# Patient Record
Sex: MALE | Race: WHITE | NOT HISPANIC OR LATINO | URBAN - METROPOLITAN AREA
[De-identification: names, ages, dates, MRNs, and addresses within clinical notes are randomized per-mention and may not be internally consistent; named-entity substitution may affect disease eponyms.]

---

## 2018-11-20 ENCOUNTER — NEW PATIENT (OUTPATIENT)
Dept: URBAN - METROPOLITAN AREA CLINIC 30 | Facility: CLINIC | Age: 83
End: 2018-11-20
Payer: MEDICARE

## 2018-11-20 DIAGNOSIS — H02.831 DERMATOCHALASIS OF RIGHT UPPER LID: ICD-10-CM

## 2018-11-20 DIAGNOSIS — H25.13 AGE-RELATED NUCLEAR CATARACT, BILATERAL: Primary | ICD-10-CM

## 2018-11-20 PROCEDURE — 99204 OFFICE O/P NEW MOD 45 MIN: CPT | Performed by: OPTOMETRIST

## 2018-11-20 PROCEDURE — 92134 CPTRZ OPH DX IMG PST SGM RTA: CPT | Performed by: OPTOMETRIST

## 2018-11-20 ASSESSMENT — VISUAL ACUITY
OS: 20/40
OD: 20/40

## 2018-11-20 ASSESSMENT — KERATOMETRY
OS: 45.04
OD: 45.43

## 2018-11-20 ASSESSMENT — INTRAOCULAR PRESSURE
OS: 16
OD: 18

## 2019-01-08 ENCOUNTER — Encounter (OUTPATIENT)
Dept: URBAN - METROPOLITAN AREA CLINIC 30 | Facility: CLINIC | Age: 84
End: 2019-01-08
Payer: MEDICARE

## 2019-01-08 DIAGNOSIS — H25.11 AGE-RELATED NUCLEAR CATARACT, RIGHT EYE: Primary | ICD-10-CM

## 2019-01-08 DIAGNOSIS — Z01.818 ENCOUNTER FOR OTHER PREPROCEDURAL EXAMINATION: Primary | ICD-10-CM

## 2019-01-08 PROCEDURE — 99213 OFFICE O/P EST LOW 20 MIN: CPT | Performed by: PHYSICIAN ASSISTANT

## 2019-01-09 ENCOUNTER — FOLLOW UP ESTABLISHED (OUTPATIENT)
Dept: URBAN - METROPOLITAN AREA CLINIC 24 | Facility: CLINIC | Age: 84
End: 2019-01-09
Payer: MEDICARE

## 2019-01-09 DIAGNOSIS — H02.834 DERMATOCHALASIS OF LEFT UPPER EYELID: ICD-10-CM

## 2019-01-09 PROCEDURE — 92083 EXTENDED VISUAL FIELD XM: CPT | Performed by: OPHTHALMOLOGY

## 2019-01-09 PROCEDURE — 92014 COMPRE OPH EXAM EST PT 1/>: CPT | Performed by: OPHTHALMOLOGY

## 2019-01-09 PROCEDURE — 92004 COMPRE OPH EXAM NEW PT 1/>: CPT | Performed by: OPHTHALMOLOGY

## 2019-01-09 PROCEDURE — 92020 GONIOSCOPY: CPT | Performed by: OPHTHALMOLOGY

## 2019-01-09 ASSESSMENT — INTRAOCULAR PRESSURE
OS: 20
OD: 20

## 2019-01-16 ENCOUNTER — SURGERY (OUTPATIENT)
Dept: URBAN - METROPOLITAN AREA SURGERY 12 | Facility: SURGERY | Age: 84
End: 2019-01-16
Payer: MEDICARE

## 2019-01-16 PROCEDURE — 66984 XCAPSL CTRC RMVL W/O ECP: CPT | Performed by: OPHTHALMOLOGY

## 2019-01-17 ENCOUNTER — POST OP (OUTPATIENT)
Dept: URBAN - METROPOLITAN AREA CLINIC 30 | Facility: CLINIC | Age: 84
End: 2019-01-17

## 2019-01-17 PROCEDURE — 99024 POSTOP FOLLOW-UP VISIT: CPT | Performed by: OPTOMETRIST

## 2019-01-17 ASSESSMENT — INTRAOCULAR PRESSURE: OD: 20

## 2019-01-23 ENCOUNTER — POST OP (OUTPATIENT)
Dept: URBAN - METROPOLITAN AREA CLINIC 30 | Facility: CLINIC | Age: 84
End: 2019-01-23
Payer: MEDICARE

## 2019-01-23 PROCEDURE — 99024 POSTOP FOLLOW-UP VISIT: CPT | Performed by: OPTOMETRIST

## 2019-01-23 PROCEDURE — 67820 REVISE EYELASHES: CPT | Performed by: OPTOMETRIST

## 2019-01-23 ASSESSMENT — INTRAOCULAR PRESSURE
OS: 15
OD: 10

## 2019-01-23 ASSESSMENT — VISUAL ACUITY
OD: 20/30
OS: 20/40

## 2019-01-30 ENCOUNTER — SURGERY (OUTPATIENT)
Dept: URBAN - METROPOLITAN AREA SURGERY 12 | Facility: SURGERY | Age: 84
End: 2019-01-30
Payer: MEDICARE

## 2019-01-30 PROCEDURE — 66984 XCAPSL CTRC RMVL W/O ECP: CPT | Performed by: OPHTHALMOLOGY

## 2019-01-31 ENCOUNTER — POST OP (OUTPATIENT)
Dept: URBAN - METROPOLITAN AREA CLINIC 30 | Facility: CLINIC | Age: 84
End: 2019-01-31

## 2019-01-31 PROCEDURE — 99024 POSTOP FOLLOW-UP VISIT: CPT | Performed by: OPTOMETRIST

## 2019-01-31 ASSESSMENT — INTRAOCULAR PRESSURE: OS: 9

## 2019-02-27 ENCOUNTER — POST OP (OUTPATIENT)
Dept: URBAN - METROPOLITAN AREA CLINIC 30 | Facility: CLINIC | Age: 84
End: 2019-02-27

## 2019-02-27 DIAGNOSIS — Z96.1 PRESENCE OF INTRAOCULAR LENS: Primary | ICD-10-CM

## 2019-02-27 PROCEDURE — 92015 DETERMINE REFRACTIVE STATE: CPT | Performed by: OPTOMETRIST

## 2019-02-27 PROCEDURE — 99024 POSTOP FOLLOW-UP VISIT: CPT | Performed by: OPTOMETRIST

## 2019-02-27 ASSESSMENT — VISUAL ACUITY
OS: 20/25
OD: 20/25

## 2019-02-27 ASSESSMENT — INTRAOCULAR PRESSURE
OS: 11
OD: 14

## 2019-03-06 ENCOUNTER — FOLLOW UP ESTABLISHED (OUTPATIENT)
Dept: URBAN - METROPOLITAN AREA CLINIC 30 | Facility: CLINIC | Age: 84
End: 2019-03-06
Payer: MEDICARE

## 2019-03-06 PROCEDURE — 92014 COMPRE OPH EXAM EST PT 1/>: CPT | Performed by: OPTOMETRIST

## 2019-03-06 PROCEDURE — 92134 CPTRZ OPH DX IMG PST SGM RTA: CPT | Performed by: OPTOMETRIST

## 2019-03-06 RX ORDER — CARBOXYMETHYLCELLULOSE SODIUM, GLYCERIN 5; 9 MG/ML; MG/ML
SOLUTION/ DROPS OPHTHALMIC
Qty: 0 | Refills: 0 | Status: INACTIVE
Start: 2019-03-06 | End: 2019-03-27

## 2019-03-06 RX ORDER — PREDNISOLONE ACETATE 10 MG/ML
1 % SUSPENSION/ DROPS OPHTHALMIC
Qty: 5 | Refills: 1 | Status: INACTIVE
Start: 2019-03-06 | End: 2019-05-28

## 2019-03-06 ASSESSMENT — VISUAL ACUITY
OS: 20/40
OD: 20/25

## 2019-03-06 ASSESSMENT — INTRAOCULAR PRESSURE
OS: 9
OD: 13

## 2019-03-27 ENCOUNTER — FOLLOW UP ESTABLISHED (OUTPATIENT)
Dept: URBAN - METROPOLITAN AREA CLINIC 30 | Facility: CLINIC | Age: 84
End: 2019-03-27
Payer: MEDICARE

## 2019-03-27 PROCEDURE — 99213 OFFICE O/P EST LOW 20 MIN: CPT | Performed by: OPTOMETRIST

## 2019-03-27 RX ORDER — BRIMONIDINE TARTRATE, TIMOLOL MALEATE 2; 5 MG/ML; MG/ML
SOLUTION/ DROPS OPHTHALMIC
Qty: 0 | Refills: 0 | Status: INACTIVE
Start: 2019-03-27 | End: 2019-04-08

## 2019-03-27 ASSESSMENT — INTRAOCULAR PRESSURE
OS: 10
OD: 12

## 2019-04-29 ENCOUNTER — RX CHECK (OUTPATIENT)
Dept: URBAN - METROPOLITAN AREA CLINIC 30 | Facility: CLINIC | Age: 84
End: 2019-04-29
Payer: MEDICARE

## 2019-04-29 DIAGNOSIS — H20.012 PRIMARY IRIDOCYCLITIS, LEFT EYE: ICD-10-CM

## 2019-04-29 DIAGNOSIS — H04.123 TEAR FILM INSUFFICIENCY OF BILATERAL LACRIMAL GLANDS: Primary | ICD-10-CM

## 2019-04-29 PROCEDURE — 99024 POSTOP FOLLOW-UP VISIT: CPT | Performed by: OPTOMETRIST

## 2019-04-29 PROCEDURE — 92015 DETERMINE REFRACTIVE STATE: CPT | Performed by: OPTOMETRIST

## 2019-04-29 ASSESSMENT — KERATOMETRY
OD: 45.28
OS: 45.05

## 2019-04-29 ASSESSMENT — VISUAL ACUITY
OS: 20/25
OD: 20/25

## 2019-04-29 ASSESSMENT — INTRAOCULAR PRESSURE
OD: 13
OS: 14

## 2019-05-28 ENCOUNTER — FOLLOW UP ESTABLISHED (OUTPATIENT)
Dept: URBAN - METROPOLITAN AREA CLINIC 30 | Facility: CLINIC | Age: 84
End: 2019-05-28
Payer: MEDICARE

## 2019-05-28 PROCEDURE — 99213 OFFICE O/P EST LOW 20 MIN: CPT | Performed by: OPTOMETRIST

## 2019-05-28 PROCEDURE — 92083 EXTENDED VISUAL FIELD XM: CPT | Performed by: OPTOMETRIST

## 2019-05-28 ASSESSMENT — INTRAOCULAR PRESSURE
OS: 14
OD: 15

## 2019-06-24 ENCOUNTER — FOLLOW UP ESTABLISHED (OUTPATIENT)
Dept: URBAN - METROPOLITAN AREA CLINIC 30 | Facility: CLINIC | Age: 84
End: 2019-06-24
Payer: MEDICARE

## 2019-06-24 PROCEDURE — 99213 OFFICE O/P EST LOW 20 MIN: CPT | Performed by: OPTOMETRIST

## 2019-06-24 PROCEDURE — 67820 REVISE EYELASHES: CPT | Performed by: OPTOMETRIST

## 2019-06-24 ASSESSMENT — INTRAOCULAR PRESSURE
OS: 12
OD: 14

## 2019-08-06 ENCOUNTER — NEW PATIENT (OUTPATIENT)
Dept: URBAN - METROPOLITAN AREA CLINIC 30 | Facility: CLINIC | Age: 84
End: 2019-08-06
Payer: MEDICARE

## 2019-08-06 PROCEDURE — 67825 REVISE EYELASHES: CPT | Performed by: OPHTHALMOLOGY

## 2019-08-06 PROCEDURE — 92285 EXTERNAL OCULAR PHOTOGRAPHY: CPT | Performed by: OPHTHALMOLOGY

## 2019-08-06 PROCEDURE — 99214 OFFICE O/P EST MOD 30 MIN: CPT | Performed by: OPHTHALMOLOGY

## 2019-08-06 ASSESSMENT — INTRAOCULAR PRESSURE
OS: 15
OD: 17

## 2019-09-17 ENCOUNTER — FOLLOW UP ESTABLISHED (OUTPATIENT)
Dept: URBAN - METROPOLITAN AREA CLINIC 10 | Facility: CLINIC | Age: 84
End: 2019-09-17
Payer: MEDICARE

## 2019-09-17 PROCEDURE — 92285 EXTERNAL OCULAR PHOTOGRAPHY: CPT | Performed by: OPHTHALMOLOGY

## 2019-09-17 PROCEDURE — 67825 REVISE EYELASHES: CPT | Performed by: OPHTHALMOLOGY

## 2019-09-17 PROCEDURE — 99213 OFFICE O/P EST LOW 20 MIN: CPT | Performed by: OPHTHALMOLOGY

## 2019-09-17 RX ORDER — NEOMYCIN SULFATE, POLYMYXIN B SULFATE AND DEXAMETHASONE 3.5; 10000; 1 MG/G; [USP'U]/G; MG/G
OINTMENT OPHTHALMIC
Qty: 2 | Refills: 0 | Status: INACTIVE
Start: 2019-09-17 | End: 2019-11-19

## 2019-11-19 ENCOUNTER — FOLLOW UP ESTABLISHED (OUTPATIENT)
Dept: URBAN - METROPOLITAN AREA CLINIC 30 | Facility: CLINIC | Age: 84
End: 2019-11-19
Payer: MEDICARE

## 2019-11-19 DIAGNOSIS — H40.30X1: Primary | ICD-10-CM

## 2019-11-19 DIAGNOSIS — H53.10 UNSPECIFIED SUBJECTIVE VISUAL DISTURBANCES: ICD-10-CM

## 2019-11-19 DIAGNOSIS — H02.052 TRICHIASIS WITHOUT ENTROPION RIGHT LOWER EYELID: ICD-10-CM

## 2019-11-19 PROCEDURE — 92133 CPTRZD OPH DX IMG PST SGM ON: CPT | Performed by: OPTOMETRIST

## 2019-11-19 PROCEDURE — 92014 COMPRE OPH EXAM EST PT 1/>: CPT | Performed by: OPTOMETRIST

## 2019-11-19 PROCEDURE — 92134 CPTRZ OPH DX IMG PST SGM RTA: CPT | Performed by: OPTOMETRIST

## 2019-11-19 ASSESSMENT — INTRAOCULAR PRESSURE
OS: 13
OD: 14

## 2019-11-19 ASSESSMENT — KERATOMETRY
OD: 45.22
OS: 44.77

## 2020-03-19 ENCOUNTER — FOLLOW UP ESTABLISHED (OUTPATIENT)
Dept: URBAN - METROPOLITAN AREA CLINIC 30 | Facility: CLINIC | Age: 85
End: 2020-03-19
Payer: MEDICARE

## 2020-03-19 PROCEDURE — 99213 OFFICE O/P EST LOW 20 MIN: CPT | Performed by: OPTOMETRIST

## 2020-03-19 PROCEDURE — 67820 REVISE EYELASHES: CPT | Performed by: OPTOMETRIST

## 2020-03-19 RX ORDER — BRIMONIDINE TARTRATE, TIMOLOL MALEATE 2; 5 MG/ML; MG/ML
SOLUTION/ DROPS OPHTHALMIC
Qty: 10 | Refills: 3 | Status: INACTIVE
Start: 2020-03-19 | End: 2021-05-17

## 2020-03-19 ASSESSMENT — INTRAOCULAR PRESSURE
OS: 14
OD: 16

## 2020-07-20 ENCOUNTER — FOLLOW UP ESTABLISHED (OUTPATIENT)
Dept: URBAN - METROPOLITAN AREA CLINIC 30 | Facility: CLINIC | Age: 85
End: 2020-07-20
Payer: MEDICARE

## 2020-07-20 PROCEDURE — 67820 REVISE EYELASHES: CPT | Performed by: OPTOMETRIST

## 2020-07-20 PROCEDURE — 92083 EXTENDED VISUAL FIELD XM: CPT | Performed by: OPTOMETRIST

## 2020-07-20 PROCEDURE — 99213 OFFICE O/P EST LOW 20 MIN: CPT | Performed by: OPTOMETRIST

## 2020-07-20 ASSESSMENT — INTRAOCULAR PRESSURE
OD: 16
OS: 14

## 2020-11-16 ENCOUNTER — FOLLOW UP ESTABLISHED (OUTPATIENT)
Dept: URBAN - METROPOLITAN AREA CLINIC 30 | Facility: CLINIC | Age: 85
End: 2020-11-16
Payer: MEDICARE

## 2020-11-16 DIAGNOSIS — H26.493 OTHER SECONDARY CATARACT, BILATERAL: ICD-10-CM

## 2020-11-16 PROCEDURE — 92015 DETERMINE REFRACTIVE STATE: CPT | Performed by: OPTOMETRIST

## 2020-11-16 PROCEDURE — 92014 COMPRE OPH EXAM EST PT 1/>: CPT | Performed by: OPTOMETRIST

## 2020-11-16 PROCEDURE — 92134 CPTRZ OPH DX IMG PST SGM RTA: CPT | Performed by: OPTOMETRIST

## 2020-11-16 PROCEDURE — 92133 CPTRZD OPH DX IMG PST SGM ON: CPT | Performed by: OPTOMETRIST

## 2020-11-16 ASSESSMENT — VISUAL ACUITY
OS: 20/30
OD: 20/30

## 2020-11-16 ASSESSMENT — INTRAOCULAR PRESSURE
OD: 16
OS: 14

## 2021-05-17 ENCOUNTER — OFFICE VISIT (OUTPATIENT)
Dept: URBAN - METROPOLITAN AREA CLINIC 30 | Facility: CLINIC | Age: 86
End: 2021-05-17
Payer: MEDICARE

## 2021-05-17 PROCEDURE — 92133 CPTRZD OPH DX IMG PST SGM ON: CPT | Performed by: OPTOMETRIST

## 2021-05-17 PROCEDURE — 99213 OFFICE O/P EST LOW 20 MIN: CPT | Performed by: OPTOMETRIST

## 2021-05-17 ASSESSMENT — INTRAOCULAR PRESSURE
OD: 11
OS: 12

## 2021-05-17 NOTE — IMPRESSION/PLAN
Impression: Glaucoma secondary to eye trauma, mild stage ; OU Tmax 20/20  (on tx Combigan OD) tx initiated elsewhere Plan: IOPs lower today, 11/12,on Combigan BID OD. VF 5/2019 unreliable OU-better than 1/19 test, OD fewer inf defects and nonspecific scatter OS. VF 7/20 unreliable OD with inf arc; OS high FP/full scatter. RNFL OCT 11/2019 abnl OD, WNL OS. RNFL 11/20 appears sl thinner OU, will repeat. RNFL 5/21 appears stable to previous, abnl OS, WNL OS. IOP appears reasonable, VFs have been unreliable-defer further VF testing. 6 months CE c disc photos.

## 2021-11-15 ENCOUNTER — OFFICE VISIT (OUTPATIENT)
Dept: URBAN - METROPOLITAN AREA CLINIC 30 | Facility: CLINIC | Age: 86
End: 2021-11-15
Payer: MEDICARE

## 2021-11-15 PROCEDURE — 92134 CPTRZ OPH DX IMG PST SGM RTA: CPT | Performed by: OPTOMETRIST

## 2021-11-15 PROCEDURE — 92014 COMPRE OPH EXAM EST PT 1/>: CPT | Performed by: OPTOMETRIST

## 2021-11-15 PROCEDURE — 92250 FUNDUS PHOTOGRAPHY W/I&R: CPT | Performed by: OPTOMETRIST

## 2021-11-15 PROCEDURE — 67820 REVISE EYELASHES: CPT | Performed by: OPTOMETRIST

## 2021-11-15 RX ORDER — BRIMONIDINE TARTRATE, TIMOLOL MALEATE 2; 5 MG/ML; MG/ML
SOLUTION/ DROPS OPHTHALMIC
Qty: 10 | Refills: 3 | Status: ACTIVE
Start: 2021-11-15

## 2021-11-15 RX ORDER — BRIMONIDINE TARTRATE, TIMOLOL MALEATE 2; 5 MG/ML; MG/ML
SOLUTION/ DROPS OPHTHALMIC
Qty: 10 | Refills: 3 | Status: INACTIVE
Start: 2021-11-15 | End: 2021-11-15

## 2021-11-15 ASSESSMENT — INTRAOCULAR PRESSURE
OD: 15
OS: 15

## 2021-11-15 ASSESSMENT — VISUAL ACUITY
OS: 20/25
OD: 20/25

## 2021-11-15 ASSESSMENT — KERATOMETRY
OS: 44.95
OD: 45.19

## 2021-11-15 NOTE — IMPRESSION/PLAN
Impression: Trichiasis without entropion, right lower lid Plan: s/p Miller. Recurring trichiasis- lashes epilated in office. Pt defers return to oculoplastics.

## 2021-11-15 NOTE — IMPRESSION/PLAN
Impression: Tear film insufficiency of bilateral lacrimal glands Plan: Previously discussed ATs and blink breaks. Pt notes intermittent tearing. Not using ATs at this time. Will resume ATs - rec qam and throughout the day prn.

## 2021-11-15 NOTE — IMPRESSION/PLAN
Impression: Other secondary cataract, bilateral Plan: Continue to monitor. Mild, tx not indicated. Mainly off axis OS.

## 2021-11-15 NOTE — IMPRESSION/PLAN
Impression: Vitreous degeneration, bilateral Plan: Stable. Pt educ on findings. Retinas flat and attached OU. Reviewed signs and symptoms of RD and to call asap if occurs. MAC OCT WNL OU-RPE mottling OU, stable.

## 2021-11-15 NOTE — IMPRESSION/PLAN
Impression: Glaucoma secondary to eye trauma, mild stage ; OU Tmax 20/20  (on tx Combigan OD) tx initiated elsewhere Plan: IOPs higher today 15 OU, reasonable on Combigan BID OD- renewed Rx today. Last used this a.m. Pt notes he occasionally misses a dose once every 2-3 months. VF 5/2019 unreliable OU-better than 1/19 test, OD fewer inf defects and nonspecific scatter OS. VF 7/20 unreliable OD with inf arc; OS high FP/full scatter. RNFL OCT 11/2019 abnl OD, WNL OS. RNFL 11/20 appears sl thinner OU, will repeat. RNFL 5/21 appears stable to previous, abnl OS, WNL OS. Disc photos 11/21. IOP appears reasonable, VFs have been unreliable-defer further VF testing.

## 2022-02-03 ENCOUNTER — OFFICE VISIT (OUTPATIENT)
Dept: URBAN - METROPOLITAN AREA CLINIC 26 | Facility: CLINIC | Age: 87
End: 2022-02-03
Payer: MEDICARE

## 2022-02-03 DIAGNOSIS — H52.4 PRESBYOPIA: ICD-10-CM

## 2022-02-03 DIAGNOSIS — H43.813 VITREOUS DEGENERATION, BILATERAL: Primary | ICD-10-CM

## 2022-02-03 PROCEDURE — 92015 DETERMINE REFRACTIVE STATE: CPT | Performed by: OPTOMETRIST

## 2022-02-03 PROCEDURE — 92134 CPTRZ OPH DX IMG PST SGM RTA: CPT | Performed by: OPTOMETRIST

## 2022-02-03 PROCEDURE — 99213 OFFICE O/P EST LOW 20 MIN: CPT | Performed by: OPTOMETRIST

## 2022-02-03 ASSESSMENT — INTRAOCULAR PRESSURE
OS: 15
OD: 14

## 2022-02-03 ASSESSMENT — VISUAL ACUITY
OS: 20/30
OD: 20/30

## 2022-02-03 NOTE — IMPRESSION/PLAN
Impression: Glaucoma secondary to eye trauma, mild stage ; OU Tmax 20/20  (on tx Combigan OD) tx initiated elsewhere Plan: currently taking combigan bid OD only.  
IOP stable
pt will return to clinic May 2022 for vf 24-2 and IOP check

## 2022-02-03 NOTE — IMPRESSION/PLAN
Impression: Presbyopia: H52.4. Plan: c/o recent fall x 2 2/2 imbalance and poor depth perception. no new findings observed today. EOM's are full. no tropia or increased phoria observed. no new retinal pathology observed. possible increased field defect, OD?? pt will rtc May for updated VF. recommend updated glasses to see if improved quality of vision and comfort.

## 2022-02-03 NOTE — IMPRESSION/PLAN
Impression: Vitreous degeneration, bilateral Plan: No retinal pathology. No family history, prior peripheral retinal disease, or other risk factors evident. Warning signs of retinal tear and detachment discussed with patient. To return to clinic STAT if any change in symptoms consistent with RT or RD.

## 2022-06-06 ENCOUNTER — OFFICE VISIT (OUTPATIENT)
Dept: URBAN - METROPOLITAN AREA CLINIC 30 | Facility: CLINIC | Age: 87
End: 2022-06-06
Payer: MEDICARE

## 2022-06-06 DIAGNOSIS — H02.052 TRICHIASIS WITHOUT ENTROPION RIGHT LOWER EYELID: ICD-10-CM

## 2022-06-06 DIAGNOSIS — H40.30X1 GLAUCOMA SECONDARY TO EYE TRAUMA, UNSPECIFIED EYE, MILD STAGE: Primary | ICD-10-CM

## 2022-06-06 PROCEDURE — 92083 EXTENDED VISUAL FIELD XM: CPT | Performed by: OPTOMETRIST

## 2022-06-06 PROCEDURE — 67820 REVISE EYELASHES: CPT | Performed by: OPTOMETRIST

## 2022-06-06 PROCEDURE — 92133 CPTRZD OPH DX IMG PST SGM ON: CPT | Performed by: OPTOMETRIST

## 2022-06-06 PROCEDURE — 99213 OFFICE O/P EST LOW 20 MIN: CPT | Performed by: OPTOMETRIST

## 2022-06-06 RX ORDER — BRIMONIDINE TARTRATE, TIMOLOL MALEATE 2; 5 MG/ML; MG/ML
SOLUTION/ DROPS OPHTHALMIC
Qty: 10 | Refills: 3 | Status: ACTIVE
Start: 2022-06-06

## 2022-06-06 ASSESSMENT — INTRAOCULAR PRESSURE
OS: 13
OD: 15

## 2022-06-06 NOTE — IMPRESSION/PLAN
Impression: Glaucoma secondary to eye trauma, mild stage ; OU Tmax 20/20  (on tx Combigan OD) tx initiated elsewhere Plan: IOP stable on Combigan BID OD, reasonable. VF 5/2019 unreliable OU-better than 1/19 test, OD fewer inf defects and nonspecific scatter OS. VF 7/20 unreliable OD with inf arc; OS high FP/full scatter. VF 6/2022: increased inf defects (though pt notes he saw fixation light double OD only), OS unreliable/full. RNFL OCT 11/2019 abnl OD, WNL OS. RNFL 11/20 appears sl thinner OU, will repeat. RNFL 5/21 appears stable to previous, abnl OS, WNL OS. RNFL 6/2022: abnormal NFL OD, normal NFL OS (58,78) Disc photos 11/21. IOP appears reasonable, VFs have been unreliable-defer further VF testing. Pt has a couple falls recently-did not hit head.

## 2022-11-09 ENCOUNTER — OFFICE VISIT (OUTPATIENT)
Dept: URBAN - METROPOLITAN AREA CLINIC 30 | Facility: CLINIC | Age: 87
End: 2022-11-09
Payer: MEDICARE

## 2022-11-09 DIAGNOSIS — H02.052 TRICHIASIS WITHOUT ENTROPION RIGHT LOWER EYELID: ICD-10-CM

## 2022-11-09 DIAGNOSIS — H43.813 VITREOUS DEGENERATION, BILATERAL: ICD-10-CM

## 2022-11-09 DIAGNOSIS — H40.30X1 GLAUCOMA SECONDARY TO EYE TRAUMA, UNSPECIFIED EYE, MILD STAGE: Primary | ICD-10-CM

## 2022-11-09 DIAGNOSIS — H52.4 PRESBYOPIA: ICD-10-CM

## 2022-11-09 PROCEDURE — 67820 REVISE EYELASHES: CPT | Performed by: OPTOMETRIST

## 2022-11-09 PROCEDURE — 99213 OFFICE O/P EST LOW 20 MIN: CPT | Performed by: OPTOMETRIST

## 2022-11-09 PROCEDURE — 92134 CPTRZ OPH DX IMG PST SGM RTA: CPT | Performed by: OPTOMETRIST

## 2022-11-09 RX ORDER — BRIMONIDINE TARTRATE, TIMOLOL MALEATE 2; 5 MG/ML; MG/ML
SOLUTION/ DROPS OPHTHALMIC
Qty: 10 | Refills: 3 | Status: ACTIVE
Start: 2022-11-09

## 2022-11-09 ASSESSMENT — KERATOMETRY
OD: 45.34
OS: 45.04

## 2022-11-09 ASSESSMENT — INTRAOCULAR PRESSURE
OD: 10
OS: 14

## 2022-11-09 ASSESSMENT — VISUAL ACUITY
OS: 20/25
OD: 20/25

## 2022-11-09 NOTE — IMPRESSION/PLAN
Impression: Glaucoma secondary to eye trauma, mild stage ; OU Tmax 20/20  (on tx Combigan OD) tx initiated elsewhere Plan: IOPs today, (10,14) on Combigan BID OD. VF 5/2019 unreliable OU-better than 1/19 test, OD fewer inf defects and nonspecific scatter OS. VF 7/20 unreliable OD with inf arc; OS high FP/full scatter. VF 6/2022: increased inf defects (though pt notes he saw fixation light double OD only), OS unreliable/full. RNFL OCT 11/2019 abnl OD, WNL OS. RNFL 11/20 appears sl thinner OU, will repeat. RNFL 5/21 appears stable to previous, abnl OS, WNL OS. RNFL 6/2022: abnormal NFL OD, normal NFL OS (58,78) Disc photos 11/21. IOP appears reasonable, VFs have been unreliable-defer further VF testing. Pt has a couple falls recently-did not hit head.

## 2022-11-09 NOTE — IMPRESSION/PLAN
Impression: Vitreous degeneration, bilateral Plan: Retinas appear flat and attached OU. Pt deferred dilation today. Reviewed s/sx of RD and to call asap if occurs. MAC OCT WNL OU.

## 2023-04-10 ENCOUNTER — OFFICE VISIT (OUTPATIENT)
Dept: URBAN - METROPOLITAN AREA CLINIC 30 | Facility: CLINIC | Age: 88
End: 2023-04-10
Payer: MEDICARE

## 2023-04-10 DIAGNOSIS — H43.813 VITREOUS DEGENERATION, BILATERAL: ICD-10-CM

## 2023-04-10 DIAGNOSIS — H40.30X1 GLAUCOMA SECONDARY TO EYE TRAUMA, UNSPECIFIED EYE, MILD STAGE: Primary | ICD-10-CM

## 2023-04-10 PROCEDURE — 92133 CPTRZD OPH DX IMG PST SGM ON: CPT | Performed by: OPTOMETRIST

## 2023-04-10 PROCEDURE — 99213 OFFICE O/P EST LOW 20 MIN: CPT | Performed by: OPTOMETRIST

## 2023-04-10 ASSESSMENT — INTRAOCULAR PRESSURE
OD: 11
OS: 12

## 2023-04-10 NOTE — IMPRESSION/PLAN
Impression: Glaucoma secondary to eye trauma, mild stage ; OU Tmax 20/20  (on tx Combigan OD) tx initiated elsewhere Plan: IOPs today, (11,12) on Combigan BID OD. VF 5/2019 unreliable OU-better than 1/19 test, OD fewer inf defects and nonspecific scatter OS. VF 7/20 unreliable OD with inf arc; OS high FP/full scatter. VF 6/2022: increased inf defects (though pt notes he saw fixation light double OD only), OS unreliable/full. RNFL OCT 11/2019 abnl OD, WNL OS. RNFL 11/20 appears sl thinner OU, will repeat. RNFL 5/21 appears stable to previous, abnl OS, WNL OS. RNFL 6/2022: abnormal NFL OD, normal NFL OS (58,78) RNFL 4/2023: abnormal NFL OD, normal NFL OS (57,73) Disc photos 11/21. IOP appears reasonable, OCT stable. VFs have been unreliable-defer further VF testing.

## 2023-04-10 NOTE — IMPRESSION/PLAN
Impression: Vitreous degeneration, bilateral Plan: Retinas appear flat and attached OU. Reviewed s/sx of RD and to call asap if occurs.

## 2023-11-07 ENCOUNTER — OFFICE VISIT (OUTPATIENT)
Dept: URBAN - METROPOLITAN AREA CLINIC 30 | Facility: CLINIC | Age: 88
End: 2023-11-07
Payer: MEDICARE

## 2023-11-07 DIAGNOSIS — H02.052 TRICHIASIS WITHOUT ENTROPION RIGHT LOWER EYELID: ICD-10-CM

## 2023-11-07 DIAGNOSIS — H43.813 VITREOUS DEGENERATION, BILATERAL: ICD-10-CM

## 2023-11-07 DIAGNOSIS — H52.4 PRESBYOPIA: ICD-10-CM

## 2023-11-07 DIAGNOSIS — H40.30X1 GLAUCOMA SECONDARY TO EYE TRAUMA, UNSPECIFIED EYE, MILD STAGE: Primary | ICD-10-CM

## 2023-11-07 DIAGNOSIS — H26.493 OTHER SECONDARY CATARACT, BILATERAL: ICD-10-CM

## 2023-11-07 PROCEDURE — 99213 OFFICE O/P EST LOW 20 MIN: CPT | Performed by: OPTOMETRIST

## 2023-11-07 PROCEDURE — 92134 CPTRZ OPH DX IMG PST SGM RTA: CPT | Performed by: OPTOMETRIST

## 2023-11-07 PROCEDURE — 67820 REVISE EYELASHES: CPT | Performed by: OPTOMETRIST

## 2023-11-07 ASSESSMENT — INTRAOCULAR PRESSURE
OS: 14
OD: 11

## 2023-11-07 ASSESSMENT — VISUAL ACUITY
OS: 20/30
OD: 20/30